# Patient Record
Sex: FEMALE | Race: WHITE | Employment: FULL TIME | URBAN - METROPOLITAN AREA
[De-identification: names, ages, dates, MRNs, and addresses within clinical notes are randomized per-mention and may not be internally consistent; named-entity substitution may affect disease eponyms.]

---

## 2018-04-14 ENCOUNTER — LAB ENCOUNTER (OUTPATIENT)
Dept: LAB | Age: 53
End: 2018-04-14
Attending: INTERNAL MEDICINE
Payer: COMMERCIAL

## 2018-04-14 DIAGNOSIS — Z00.01 ENCOUNTER FOR GENERAL ADULT MEDICAL EXAMINATION WITH ABNORMAL FINDINGS: Primary | ICD-10-CM

## 2018-04-14 PROCEDURE — 85025 COMPLETE CBC W/AUTO DIFF WBC: CPT

## 2018-04-14 PROCEDURE — 80061 LIPID PANEL: CPT

## 2018-04-14 PROCEDURE — 86803 HEPATITIS C AB TEST: CPT

## 2018-04-14 PROCEDURE — 85060 BLOOD SMEAR INTERPRETATION: CPT

## 2018-04-14 PROCEDURE — 80050 GENERAL HEALTH PANEL: CPT

## 2018-04-14 PROCEDURE — 83036 HEMOGLOBIN GLYCOSYLATED A1C: CPT

## 2018-04-14 PROCEDURE — 82306 VITAMIN D 25 HYDROXY: CPT

## 2018-04-14 PROCEDURE — 81001 URINALYSIS AUTO W/SCOPE: CPT

## 2018-04-14 PROCEDURE — 36415 COLL VENOUS BLD VENIPUNCTURE: CPT

## 2018-04-14 PROCEDURE — 80053 COMPREHEN METABOLIC PANEL: CPT

## 2018-05-10 ENCOUNTER — HOSPITAL ENCOUNTER (OUTPATIENT)
Dept: ULTRASOUND IMAGING | Facility: HOSPITAL | Age: 53
Discharge: HOME | End: 2018-05-10
Attending: INTERNAL MEDICINE
Payer: COMMERCIAL

## 2018-05-10 ENCOUNTER — HOSPITAL ENCOUNTER (OUTPATIENT)
Dept: MAMMOGRAPHY | Facility: HOSPITAL | Age: 53
Discharge: HOME | End: 2018-05-10
Attending: INTERNAL MEDICINE
Payer: COMMERCIAL

## 2018-05-10 DIAGNOSIS — IMO0002 LUMP: ICD-10-CM

## 2018-05-10 PROBLEM — M16.0 PRIMARY OSTEOARTHRITIS OF BOTH HIPS: Status: ACTIVE | Noted: 2018-05-10

## 2018-05-10 PROCEDURE — 77066 DX MAMMO INCL CAD BI: CPT | Performed by: INTERNAL MEDICINE

## 2018-05-10 PROCEDURE — 76642 ULTRASOUND BREAST LIMITED: CPT | Performed by: INTERNAL MEDICINE

## 2018-09-11 ENCOUNTER — HOSPITAL ENCOUNTER (OUTPATIENT)
Dept: ULTRASOUND IMAGING | Facility: HOSPITAL | Age: 53
Discharge: HOME | End: 2018-09-11
Attending: INTERNAL MEDICINE
Payer: COMMERCIAL

## 2018-09-11 DIAGNOSIS — R19.00 PELVIC MASS: ICD-10-CM

## 2018-09-11 PROCEDURE — 76856 US EXAM PELVIC COMPLETE: CPT | Performed by: INTERNAL MEDICINE

## 2018-09-11 PROCEDURE — 76830 TRANSVAGINAL US NON-OB: CPT | Performed by: INTERNAL MEDICINE

## 2018-09-24 ENCOUNTER — OFFICE VISIT (OUTPATIENT)
Dept: OBGYN CLINIC | Facility: CLINIC | Age: 53
End: 2018-09-24
Payer: COMMERCIAL

## 2018-09-24 VITALS — HEART RATE: 78 BPM | DIASTOLIC BLOOD PRESSURE: 77 MMHG | SYSTOLIC BLOOD PRESSURE: 126 MMHG

## 2018-09-24 DIAGNOSIS — Z12.4 SCREENING FOR MALIGNANT NEOPLASM OF CERVIX: Primary | ICD-10-CM

## 2018-09-24 DIAGNOSIS — N95.0 POST-MENOPAUSAL BLEEDING: ICD-10-CM

## 2018-09-24 DIAGNOSIS — D25.1 INTRAMURAL LEIOMYOMA OF UTERUS: ICD-10-CM

## 2018-09-24 DIAGNOSIS — N95.0 POSTMENOPAUSAL BLEEDING: ICD-10-CM

## 2018-09-24 PROCEDURE — 58100 BIOPSY OF UTERUS LINING: CPT | Performed by: OBSTETRICS & GYNECOLOGY

## 2018-09-24 PROCEDURE — 99213 OFFICE O/P EST LOW 20 MIN: CPT | Performed by: OBSTETRICS & GYNECOLOGY

## 2018-09-24 NOTE — PROGRESS NOTES
HPI:    Patient ID: Sandeep Randle is a 48year old female. HPI  and . SHe did have amenorrhea from 2017 to 2018 and then bled 3 days. Hx of fibroid embolization with Dr Rae Lau in .  Last US was  and visit was with Dr Mell Chandra No murmur heard. Pulmonary/Chest: Effort normal and breath sounds normal. She has no wheezes. She has no rales. Bilateral breasts without skin / nipple changes, mass, tenderness or axillary adenopathy. Abdominal: Soft.  She exhibits no distension a

## 2018-09-28 ENCOUNTER — TELEPHONE (OUTPATIENT)
Dept: OBGYN CLINIC | Facility: CLINIC | Age: 53
End: 2018-09-28

## 2018-09-28 NOTE — TELEPHONE ENCOUNTER
Informed pt PAP/HPV results were both negative. Informed pt that [facility] results were not reviewed by TIMMY and message will be sent to TIMMY to review and advise. Pt aware we will let her know once TIMMY responds. Pt verbalized understanding.

## 2018-10-02 ENCOUNTER — TELEPHONE (OUTPATIENT)
Dept: OBGYN CLINIC | Facility: CLINIC | Age: 53
End: 2018-10-02

## 2018-10-02 NOTE — TELEPHONE ENCOUNTER
Pt states she had an MRI done a few weeks ago that she wants TIMMY to review. I do not see that we received it. Fax # given, pt will ask office to resend to us.

## 2018-10-03 ENCOUNTER — TELEPHONE (OUTPATIENT)
Dept: OBGYN CLINIC | Facility: CLINIC | Age: 53
End: 2018-10-03

## 2018-10-03 NOTE — TELEPHONE ENCOUNTER
Received MRI of the pelvis without and with IV contrast from [facility]. Placed on Ihsan's desk.

## 2018-10-04 PROBLEM — N95.0 POST-MENOPAUSAL BLEEDING: Status: ACTIVE | Noted: 2018-10-04

## 2018-10-04 PROBLEM — D21.9 FIBROID: Status: ACTIVE | Noted: 2018-10-04

## 2018-10-04 NOTE — TELEPHONE ENCOUNTER
Pt informed that MRI results were received and placed on Fuentes desk yesterday. Pt informed that once TIMMY reviews report we will contact her with any recs. Pt verbalized understanding.

## 2018-10-04 NOTE — PROGRESS NOTES
Endometrial Biopsy    Pre-Procedure Care:   Consent was obtained. Procedure/risks were explained. Questions were answered. Correct patient was identified. Correct side and site were confirmed.     Pregnancy Results: n/a   Birth control method(s) used: P

## 2018-10-08 NOTE — TELEPHONE ENCOUNTER
Pt stated that she needs TIMMY to review MRI report that was placed on Fuentes desk. Pt stated she needs to know \"what the results mean to him\" and what her next step will be.  Pt asking if \"the growth can just be left there or if it needs to be surgically re

## 2018-10-10 NOTE — TELEPHONE ENCOUNTER
PER PT CALLING BACK TO CHECK THE STATUS OF GETTING A CALL BACK / REGARDING HIS OPINION ON  TEST RESULTS / PLS ADV

## 2018-10-11 NOTE — TELEPHONE ENCOUNTER
PER PT REQUESTING TO SPEAK TO DR WARNER / REGARDING TEST RESULTS OF THE BRAD / PT STATE SHE WOULD LIKE TO KNOW THE NEXT STEP / PLS ADV

## 2018-10-11 NOTE — TELEPHONE ENCOUNTER
Pt states she is waiting to speak to TIMMY regarding \"next steps\". Pt reports having \"terrible hip pain when I walk. \" Pt states Marilee Kat will not see pt until TIMMY \"determines what growth is on my hip. \" Informed pt that message was sent to TIMMY and anjelica

## 2018-10-15 NOTE — TELEPHONE ENCOUNTER
Pt upset she has not heard back from TIMMY, would like a message sent to him. Pt does not want a call back from the nurse.  Please advise

## 2018-10-16 ENCOUNTER — TELEPHONE (OUTPATIENT)
Dept: OBGYN CLINIC | Facility: CLINIC | Age: 53
End: 2018-10-16

## 2018-10-16 NOTE — TELEPHONE ENCOUNTER
LMTCB. Sent to TIMMY to call the pt to discuss the results and next step after review the results. See previous notes.

## 2018-10-18 NOTE — TELEPHONE ENCOUNTER
Spoke with MA who is rooming TIMMY in [place] to please relay message to TIMMY that pt is still waiting for his call.

## 2018-10-18 NOTE — TELEPHONE ENCOUNTER
Pt. States that she has been waiting 3 weeks to get her MRI of Pelvis results. Pt. States that she needs to find out, next plan of treatment, after reviewing results.

## 2018-10-25 NOTE — TELEPHONE ENCOUNTER
TIMMY Needs to look at 3 MRI's last MRI showed with contrast. All MRI's need to be reviewed to determine if fibroid needs to be removed. please advise.  Pt needs reading for the Orthopaedic Dr who won't proceed with any treatment until he get's reading

## 2018-10-29 NOTE — TELEPHONE ENCOUNTER
Pt walked into office this morning stating its been 5 weeks and still has not heard from TIMMY. Pt states she cant go back to orthopedic doctor until she gets the all clear from TIMMY. States she is extremely frustrated and is considering changing physicians due to the lack of communication.

## 2018-10-30 ENCOUNTER — TELEPHONE (OUTPATIENT)
Dept: OBGYN CLINIC | Facility: CLINIC | Age: 53
End: 2018-10-30

## 2018-10-30 DIAGNOSIS — D25.9 UTERINE LEIOMYOMA, UNSPECIFIED LOCATION: Primary | ICD-10-CM

## 2018-10-30 NOTE — TELEPHONE ENCOUNTER
Carine Haley and I spoke yesterday concerning MRI, US and suspected large fibroid. We discussed recommended surgery. I do not recommend myomectomy due to age group and no further pregnancy. We should remove uterus intact.  She has  hx and the overall size of

## 2018-10-31 NOTE — TELEPHONE ENCOUNTER
Balta. Patient scheduled for her procedure 11/26/18 7:30am VARUN/BSO Dr. Loco Myaa. Pat orders. Instructions mailed to address on file.

## 2018-11-05 NOTE — TELEPHONE ENCOUNTER
Pt is calling to speak to office asking to speak to [facility] - CAROLINE AMOL Directly ,

## 2018-11-08 NOTE — TELEPHONE ENCOUNTER
Prior auth for cpt 26163/inpatient was intiated. Clinical info routed to [phone]. REF [id number]. Will fl/u for status of approval in 3-5 business days.

## 2018-11-12 NOTE — TELEPHONE ENCOUNTER
Received documentation noting the the procedure and initial day was approved, if patient exceeds that time, hospital staff will needed to obtain PA for those days.  [id number]

## 2018-11-17 RX ORDER — IBUPROFEN 400 MG/1
400 TABLET ORAL EVERY 6 HOURS PRN
Status: ON HOLD | COMMUNITY
End: 2018-11-29

## 2018-11-19 ENCOUNTER — APPOINTMENT (OUTPATIENT)
Dept: LAB | Age: 53
End: 2018-11-19
Attending: OBSTETRICS & GYNECOLOGY
Payer: COMMERCIAL

## 2018-11-19 ENCOUNTER — LAB ENCOUNTER (OUTPATIENT)
Dept: LAB | Age: 53
End: 2018-11-19
Attending: OBSTETRICS & GYNECOLOGY
Payer: COMMERCIAL

## 2018-11-19 DIAGNOSIS — Z01.818 PREOPERATIVE TESTING: ICD-10-CM

## 2018-11-19 PROCEDURE — 86900 BLOOD TYPING SEROLOGIC ABO: CPT

## 2018-11-19 PROCEDURE — 80048 BASIC METABOLIC PNL TOTAL CA: CPT

## 2018-11-19 PROCEDURE — 85025 COMPLETE CBC W/AUTO DIFF WBC: CPT

## 2018-11-19 PROCEDURE — 86850 RBC ANTIBODY SCREEN: CPT

## 2018-11-19 PROCEDURE — 36415 COLL VENOUS BLD VENIPUNCTURE: CPT

## 2018-11-19 PROCEDURE — 93005 ELECTROCARDIOGRAM TRACING: CPT

## 2018-11-19 PROCEDURE — 93010 ELECTROCARDIOGRAM REPORT: CPT | Performed by: OBSTETRICS & GYNECOLOGY

## 2018-11-19 PROCEDURE — 86901 BLOOD TYPING SEROLOGIC RH(D): CPT

## 2018-11-26 ENCOUNTER — ANESTHESIA EVENT (OUTPATIENT)
Dept: SURGERY | Facility: HOSPITAL | Age: 53
DRG: 742 | End: 2018-11-26
Payer: COMMERCIAL

## 2018-11-26 ENCOUNTER — HOSPITAL ENCOUNTER (INPATIENT)
Facility: HOSPITAL | Age: 53
LOS: 3 days | Discharge: HOME | DRG: 742 | End: 2018-11-29
Attending: OBSTETRICS & GYNECOLOGY | Admitting: OBSTETRICS & GYNECOLOGY
Payer: COMMERCIAL

## 2018-11-26 ENCOUNTER — ANESTHESIA (OUTPATIENT)
Dept: SURGERY | Facility: HOSPITAL | Age: 53
DRG: 742 | End: 2018-11-26
Payer: COMMERCIAL

## 2018-11-26 DIAGNOSIS — Z01.818 PREOPERATIVE TESTING: Primary | ICD-10-CM

## 2018-11-26 DIAGNOSIS — D25.9 UTERINE LEIOMYOMA, UNSPECIFIED LOCATION: ICD-10-CM

## 2018-11-26 PROBLEM — Z90.710 STATUS POST TOTAL ABDOMINAL HYSTERECTOMY AND BILATERAL SALPINGO-OOPHORECTOMY: Status: ACTIVE | Noted: 2018-11-26

## 2018-11-26 PROBLEM — K21.9 GASTROESOPHAGEAL REFLUX DISEASE WITHOUT ESOPHAGITIS: Status: ACTIVE | Noted: 2018-11-26

## 2018-11-26 PROBLEM — E11.9 TYPE 2 DIABETES MELLITUS (HCC): Status: ACTIVE | Noted: 2018-11-26

## 2018-11-26 PROBLEM — Z90.710 STATUS POST HYSTERECTOMY: Status: ACTIVE | Noted: 2018-11-26

## 2018-11-26 PROBLEM — Z90.722 STATUS POST TOTAL ABDOMINAL HYSTERECTOMY AND BILATERAL SALPINGO-OOPHORECTOMY: Status: ACTIVE | Noted: 2018-11-26

## 2018-11-26 PROBLEM — Z90.79 STATUS POST TOTAL ABDOMINAL HYSTERECTOMY AND BILATERAL SALPINGO-OOPHORECTOMY: Status: ACTIVE | Noted: 2018-11-26

## 2018-11-26 PROCEDURE — 0UT70ZZ RESECTION OF BILATERAL FALLOPIAN TUBES, OPEN APPROACH: ICD-10-PCS | Performed by: OBSTETRICS & GYNECOLOGY

## 2018-11-26 PROCEDURE — 0UT20ZZ RESECTION OF BILATERAL OVARIES, OPEN APPROACH: ICD-10-PCS | Performed by: OBSTETRICS & GYNECOLOGY

## 2018-11-26 PROCEDURE — 0DNW0ZZ RELEASE PERITONEUM, OPEN APPROACH: ICD-10-PCS | Performed by: OBSTETRICS & GYNECOLOGY

## 2018-11-26 PROCEDURE — 0UT90ZZ RESECTION OF UTERUS, OPEN APPROACH: ICD-10-PCS | Performed by: OBSTETRICS & GYNECOLOGY

## 2018-11-26 PROCEDURE — 58150 TOTAL HYSTERECTOMY: CPT | Performed by: OBSTETRICS & GYNECOLOGY

## 2018-11-26 PROCEDURE — 0DNU0ZZ RELEASE OMENTUM, OPEN APPROACH: ICD-10-PCS | Performed by: OBSTETRICS & GYNECOLOGY

## 2018-11-26 RX ORDER — HYDROCODONE BITARTRATE AND ACETAMINOPHEN 5; 325 MG/1; MG/1
2 TABLET ORAL AS NEEDED
Status: DISCONTINUED | OUTPATIENT
Start: 2018-11-26 | End: 2018-11-26 | Stop reason: HOSPADM

## 2018-11-26 RX ORDER — MORPHINE SULFATE 4 MG/ML
4 INJECTION, SOLUTION INTRAMUSCULAR; INTRAVENOUS EVERY 10 MIN PRN
Status: DISCONTINUED | OUTPATIENT
Start: 2018-11-26 | End: 2018-11-26 | Stop reason: HOSPADM

## 2018-11-26 RX ORDER — SODIUM CHLORIDE, SODIUM LACTATE, POTASSIUM CHLORIDE, CALCIUM CHLORIDE 600; 310; 30; 20 MG/100ML; MG/100ML; MG/100ML; MG/100ML
INJECTION, SOLUTION INTRAVENOUS CONTINUOUS
Status: DISCONTINUED | OUTPATIENT
Start: 2018-11-26 | End: 2018-11-26

## 2018-11-26 RX ORDER — MORPHINE SULFATE 4 MG/ML
2 INJECTION, SOLUTION INTRAMUSCULAR; INTRAVENOUS EVERY 10 MIN PRN
Status: DISCONTINUED | OUTPATIENT
Start: 2018-11-26 | End: 2018-11-26 | Stop reason: HOSPADM

## 2018-11-26 RX ORDER — SODIUM PHOSPHATE, DIBASIC AND SODIUM PHOSPHATE, MONOBASIC 7; 19 G/133ML; G/133ML
1 ENEMA RECTAL ONCE AS NEEDED
Status: DISCONTINUED | OUTPATIENT
Start: 2018-11-26 | End: 2018-11-29

## 2018-11-26 RX ORDER — MIDAZOLAM HYDROCHLORIDE 1 MG/ML
INJECTION INTRAMUSCULAR; INTRAVENOUS AS NEEDED
Status: DISCONTINUED | OUTPATIENT
Start: 2018-11-26 | End: 2018-11-26 | Stop reason: SURG

## 2018-11-26 RX ORDER — HEPARIN SODIUM 5000 [USP'U]/ML
5000 INJECTION, SOLUTION INTRAVENOUS; SUBCUTANEOUS ONCE
Status: COMPLETED | OUTPATIENT
Start: 2018-11-26 | End: 2018-11-26

## 2018-11-26 RX ORDER — ONDANSETRON 2 MG/ML
4 INJECTION INTRAMUSCULAR; INTRAVENOUS ONCE AS NEEDED
Status: DISCONTINUED | OUTPATIENT
Start: 2018-11-26 | End: 2018-11-26 | Stop reason: HOSPADM

## 2018-11-26 RX ORDER — FAMOTIDINE 20 MG/1
20 TABLET ORAL ONCE
Status: COMPLETED | OUTPATIENT
Start: 2018-11-26 | End: 2018-11-26

## 2018-11-26 RX ORDER — METOCLOPRAMIDE 10 MG/1
10 TABLET ORAL ONCE
Status: COMPLETED | OUTPATIENT
Start: 2018-11-26 | End: 2018-11-26

## 2018-11-26 RX ORDER — DEXTROSE MONOHYDRATE 25 G/50ML
50 INJECTION, SOLUTION INTRAVENOUS AS NEEDED
Status: DISCONTINUED | OUTPATIENT
Start: 2018-11-26 | End: 2018-11-29

## 2018-11-26 RX ORDER — DIPHENHYDRAMINE HYDROCHLORIDE 50 MG/ML
12.5 INJECTION INTRAMUSCULAR; INTRAVENOUS EVERY 4 HOURS PRN
Status: DISCONTINUED | OUTPATIENT
Start: 2018-11-26 | End: 2018-11-29

## 2018-11-26 RX ORDER — HEPARIN SODIUM 5000 [USP'U]/ML
5000 INJECTION, SOLUTION INTRAVENOUS; SUBCUTANEOUS EVERY 8 HOURS SCHEDULED
Status: DISCONTINUED | OUTPATIENT
Start: 2018-11-26 | End: 2018-11-29

## 2018-11-26 RX ORDER — ACETAMINOPHEN 500 MG
1000 TABLET ORAL ONCE
Status: COMPLETED | OUTPATIENT
Start: 2018-11-26 | End: 2018-11-26

## 2018-11-26 RX ORDER — NALOXONE HYDROCHLORIDE 0.4 MG/ML
80 INJECTION, SOLUTION INTRAMUSCULAR; INTRAVENOUS; SUBCUTANEOUS AS NEEDED
Status: DISCONTINUED | OUTPATIENT
Start: 2018-11-26 | End: 2018-11-26 | Stop reason: HOSPADM

## 2018-11-26 RX ORDER — DOCUSATE SODIUM 100 MG/1
100 CAPSULE, LIQUID FILLED ORAL 2 TIMES DAILY
Status: DISCONTINUED | OUTPATIENT
Start: 2018-11-26 | End: 2018-11-29

## 2018-11-26 RX ORDER — SODIUM CHLORIDE, SODIUM LACTATE, POTASSIUM CHLORIDE, CALCIUM CHLORIDE 600; 310; 30; 20 MG/100ML; MG/100ML; MG/100ML; MG/100ML
INJECTION, SOLUTION INTRAVENOUS CONTINUOUS
Status: DISCONTINUED | OUTPATIENT
Start: 2018-11-26 | End: 2018-11-29

## 2018-11-26 RX ORDER — SODIUM CHLORIDE 0.9 % (FLUSH) 0.9 %
10 SYRINGE (ML) INJECTION AS NEEDED
Status: DISCONTINUED | OUTPATIENT
Start: 2018-11-26 | End: 2018-11-26 | Stop reason: HOSPADM

## 2018-11-26 RX ORDER — INSULIN ASPART 100 [IU]/ML
6 INJECTION, SOLUTION INTRAVENOUS; SUBCUTANEOUS ONCE
Status: COMPLETED | OUTPATIENT
Start: 2018-11-26 | End: 2018-11-26

## 2018-11-26 RX ORDER — KETOROLAC TROMETHAMINE 30 MG/ML
30 INJECTION, SOLUTION INTRAMUSCULAR; INTRAVENOUS EVERY 6 HOURS PRN
Status: ACTIVE | OUTPATIENT
Start: 2018-11-26 | End: 2018-11-28

## 2018-11-26 RX ORDER — HYDROCODONE BITARTRATE AND ACETAMINOPHEN 5; 325 MG/1; MG/1
1 TABLET ORAL AS NEEDED
Status: DISCONTINUED | OUTPATIENT
Start: 2018-11-26 | End: 2018-11-26 | Stop reason: HOSPADM

## 2018-11-26 RX ORDER — NALOXONE HYDROCHLORIDE 0.4 MG/ML
0.08 INJECTION, SOLUTION INTRAMUSCULAR; INTRAVENOUS; SUBCUTANEOUS
Status: DISCONTINUED | OUTPATIENT
Start: 2018-11-26 | End: 2018-11-29

## 2018-11-26 RX ORDER — MORPHINE SULFATE 10 MG/ML
6 INJECTION, SOLUTION INTRAMUSCULAR; INTRAVENOUS EVERY 10 MIN PRN
Status: DISCONTINUED | OUTPATIENT
Start: 2018-11-26 | End: 2018-11-26 | Stop reason: HOSPADM

## 2018-11-26 RX ORDER — NALBUPHINE HCL 10 MG/ML
2.5 AMPUL (ML) INJECTION EVERY 4 HOURS PRN
Status: DISCONTINUED | OUTPATIENT
Start: 2018-11-26 | End: 2018-11-29

## 2018-11-26 RX ORDER — GLYCOPYRROLATE 0.2 MG/ML
INJECTION INTRAMUSCULAR; INTRAVENOUS AS NEEDED
Status: DISCONTINUED | OUTPATIENT
Start: 2018-11-26 | End: 2018-11-26 | Stop reason: SURG

## 2018-11-26 RX ORDER — CEFAZOLIN SODIUM/WATER 2 G/20 ML
2 SYRINGE (ML) INTRAVENOUS ONCE
Status: COMPLETED | OUTPATIENT
Start: 2018-11-26 | End: 2018-11-26

## 2018-11-26 RX ORDER — ONDANSETRON 2 MG/ML
4 INJECTION INTRAMUSCULAR; INTRAVENOUS EVERY 8 HOURS PRN
Status: DISCONTINUED | OUTPATIENT
Start: 2018-11-26 | End: 2018-11-29

## 2018-11-26 RX ORDER — POLYETHYLENE GLYCOL 3350 17 G/17G
17 POWDER, FOR SOLUTION ORAL DAILY PRN
Status: DISCONTINUED | OUTPATIENT
Start: 2018-11-26 | End: 2018-11-29

## 2018-11-26 RX ORDER — DEXTROSE MONOHYDRATE 25 G/50ML
50 INJECTION, SOLUTION INTRAVENOUS
Status: DISCONTINUED | OUTPATIENT
Start: 2018-11-26 | End: 2018-11-26 | Stop reason: HOSPADM

## 2018-11-26 RX ORDER — SODIUM CHLORIDE 0.9 % (FLUSH) 0.9 %
10 SYRINGE (ML) INJECTION AS NEEDED
Status: DISCONTINUED | OUTPATIENT
Start: 2018-11-26 | End: 2018-11-29

## 2018-11-26 RX ORDER — BISACODYL 10 MG
10 SUPPOSITORY, RECTAL RECTAL
Status: DISCONTINUED | OUTPATIENT
Start: 2018-11-26 | End: 2018-11-29

## 2018-11-26 RX ORDER — LIDOCAINE HYDROCHLORIDE 10 MG/ML
INJECTION, SOLUTION EPIDURAL; INFILTRATION; INTRACAUDAL; PERINEURAL AS NEEDED
Status: DISCONTINUED | OUTPATIENT
Start: 2018-11-26 | End: 2018-11-26 | Stop reason: SURG

## 2018-11-26 RX ORDER — HALOPERIDOL 5 MG/ML
0.25 INJECTION INTRAMUSCULAR ONCE AS NEEDED
Status: DISCONTINUED | OUTPATIENT
Start: 2018-11-26 | End: 2018-11-26 | Stop reason: HOSPADM

## 2018-11-26 RX ORDER — ROCURONIUM BROMIDE 10 MG/ML
INJECTION, SOLUTION INTRAVENOUS AS NEEDED
Status: DISCONTINUED | OUTPATIENT
Start: 2018-11-26 | End: 2018-11-26 | Stop reason: SURG

## 2018-11-26 RX ORDER — ONDANSETRON 2 MG/ML
INJECTION INTRAMUSCULAR; INTRAVENOUS AS NEEDED
Status: DISCONTINUED | OUTPATIENT
Start: 2018-11-26 | End: 2018-11-26 | Stop reason: SURG

## 2018-11-26 RX ORDER — SODIUM CHLORIDE, SODIUM LACTATE, POTASSIUM CHLORIDE, CALCIUM CHLORIDE 600; 310; 30; 20 MG/100ML; MG/100ML; MG/100ML; MG/100ML
INJECTION, SOLUTION INTRAVENOUS CONTINUOUS
Status: DISCONTINUED | OUTPATIENT
Start: 2018-11-26 | End: 2018-11-26 | Stop reason: HOSPADM

## 2018-11-26 RX ORDER — ONDANSETRON 4 MG/1
4 TABLET, FILM COATED ORAL EVERY 8 HOURS PRN
Status: DISCONTINUED | OUTPATIENT
Start: 2018-11-26 | End: 2018-11-29

## 2018-11-26 RX ORDER — NEOSTIGMINE METHYLSULFATE 0.5 MG/ML
INJECTION INTRAVENOUS AS NEEDED
Status: DISCONTINUED | OUTPATIENT
Start: 2018-11-26 | End: 2018-11-26 | Stop reason: SURG

## 2018-11-26 RX ADMIN — ROCURONIUM BROMIDE 10 MG: 10 INJECTION, SOLUTION INTRAVENOUS at 09:05:00

## 2018-11-26 RX ADMIN — MIDAZOLAM HYDROCHLORIDE 2 MG: 1 INJECTION INTRAMUSCULAR; INTRAVENOUS at 07:41:00

## 2018-11-26 RX ADMIN — GLYCOPYRROLATE 0.6 MG: 0.2 INJECTION INTRAMUSCULAR; INTRAVENOUS at 09:55:00

## 2018-11-26 RX ADMIN — LIDOCAINE HYDROCHLORIDE 50 MG: 10 INJECTION, SOLUTION EPIDURAL; INFILTRATION; INTRACAUDAL; PERINEURAL at 07:41:00

## 2018-11-26 RX ADMIN — CEFAZOLIN SODIUM/WATER 2 G: 2 G/20 ML SYRINGE (ML) INTRAVENOUS at 07:53:00

## 2018-11-26 RX ADMIN — SODIUM CHLORIDE, SODIUM LACTATE, POTASSIUM CHLORIDE, CALCIUM CHLORIDE: 600; 310; 30; 20 INJECTION, SOLUTION INTRAVENOUS at 07:41:00

## 2018-11-26 RX ADMIN — NEOSTIGMINE METHYLSULFATE 4 MG: 0.5 INJECTION INTRAVENOUS at 09:55:00

## 2018-11-26 RX ADMIN — GLYCOPYRROLATE 0.2 MG: 0.2 INJECTION INTRAMUSCULAR; INTRAVENOUS at 07:41:00

## 2018-11-26 RX ADMIN — SODIUM CHLORIDE, SODIUM LACTATE, POTASSIUM CHLORIDE, CALCIUM CHLORIDE: 600; 310; 30; 20 INJECTION, SOLUTION INTRAVENOUS at 10:00:00

## 2018-11-26 RX ADMIN — SODIUM CHLORIDE, SODIUM LACTATE, POTASSIUM CHLORIDE, CALCIUM CHLORIDE: 600; 310; 30; 20 INJECTION, SOLUTION INTRAVENOUS at 09:15:00

## 2018-11-26 RX ADMIN — ONDANSETRON 4 MG: 2 INJECTION INTRAMUSCULAR; INTRAVENOUS at 07:41:00

## 2018-11-26 RX ADMIN — ROCURONIUM BROMIDE 50 MG: 10 INJECTION, SOLUTION INTRAVENOUS at 07:41:00

## 2018-11-26 NOTE — CONSULTS
[facility]    PATIENT'S NAME: [facility]   ATTENDING PHYSICIAN: Palak Vaz DO   CONSULTING PHYSICIAN: Jesse Reyes MD   PATIENT ACCOUNT#:   [id number]    LOCATION:  [address] [place] [address] Dr #:   [id number]       DATE OF B auscultation. HEART:  Regular rhythm. S1, S2 normal.  BREASTS:  Deferred at this time. ABDOMEN:  Protuberant, soft. There are decreased bowel sounds present. Postoperative tenderness present. EXTREMITIES:  Without edema.   Pulses are 1 to 2+ symmetric

## 2018-11-26 NOTE — ANESTHESIA PROCEDURE NOTES
ANESTHESIA INTUBATION  Urgency: elective    Airway not difficult    General Information and Staff    Patient location during procedure: OR  Anesthesiologist: Ivonne Em MD  Resident/CRNA: Sreekanth Carrasco CRNA  Performed: Guillaume Ortega

## 2018-11-26 NOTE — INTERVAL H&P NOTE
Pre-op Diagnosis: Uterine leiomyoma, unspecified location [D25.9]    The above referenced H&P was reviewed by Lyssa Dunne.  DO Malissa on 11/26/2018, the patient was examined and no significant changes have occurred in the patient's condition since the H&P was

## 2018-11-26 NOTE — ANESTHESIA PREPROCEDURE EVALUATION
Anesthesia PreOp Note    HPI:     Yaa Ferrara is a 48year old female who presents for preoperative consultation requested by: Jody Can DO    Date of Surgery: 11/26/2018    Procedure(s):  ABDOMINAL HYSTERECTOMY  Indication: Uterine leiomyoma Bibi Parker Eagle, CRNA 200 mg at 11/26/18 0741   Rocuronium Bromide (ZEMURON) injection  Intravenous PRN Fiorella Pollen, CRNA 50 mg at 11/26/18 0741   ondansetron HCl (ZOFRAN) injection  Intravenous PRN Fiorella Pollen, CRNA 4 mg CL 98 11/19/2018    CO2 26 11/19/2018    BUN 10 11/19/2018    CREATSERUM 0.71 11/19/2018     (H) 11/19/2018    PGLU 163 (H) 11/26/2018    CA 9.2 11/19/2018          Vital Signs: Body mass index is 34.84 kg/m².    height is 1.626 m (5' 4\") and we

## 2018-11-26 NOTE — H&P
[facility]    History and Physical    Marshall Fabkraig Patient Status:  Surgery Admit    1965 MRN [id number]   Location [place] [address] Attending Max Torres, 3 [facility] Day # 0 PCP Vickey Sanford MD     Date: Tobacco Use      Smoking status: Never Smoker      Smokeless tobacco: Never Used    Alcohol use: No    Drug use: No    Allergies/Medications: Allergies:   Sulfacetamide           HIVES    No medications prior to admission.     Review of Systems:   Review within 1 year of adm    Results:     Lab Results   Component Value Date    WBC 6.3 11/19/2018    HGB 15.7 11/19/2018    HCT 45.9 11/19/2018     11/19/2018    CREATSERUM 0.71 11/19/2018    BUN 10 11/19/2018     (L) 11/19/2018    K 4.5 11/19/201

## 2018-11-26 NOTE — ANESTHESIA POSTPROCEDURE EVALUATION
Patient: Chente Oliver    Procedure Summary     Date:  11/26/18 Room / Location:  [facility] OR  / [facility] OR    Anesthesia Start:  7021 Anesthesia Stop:  6790    Procedure:  ABDOMINAL HYSTERECTOMY (Bilateral Abdomen) Diagnosis:       Uterine leiomyom

## 2018-11-26 NOTE — BRIEF OP NOTE
Pre-Operative Diagnosis: Uterine leiomyoma, unspecified location [D25.9]     Post-Operative Diagnosis: Uterine leiomyoma, unspecified location [D25.9]      Procedure Performed:   Procedure(s):  Total abdominal hysterectomy with bilateral salpingo-oophorect

## 2018-11-26 NOTE — OPERATIVE REPORT
[facility]    PATIENT'S NAME: Ray Potter   ATTENDING PHYSICIAN: Dai Vaz DO   OPERATING PHYSICIAN: Paulina Vaz DO   PATIENT ACCOUNT#:   [de-identified]    LOCATION:  [facility] PACU 7 [facility] 10  MEDICAL RECORD #:   [id number]       D from the anterior wall. At this point, we encountered the dense adhesions from the anterior uterine wall to the anterior abdominal wall as well. These were taken down sharply though it was difficult to gauge where bladder was at this time.   Decision was were left with a large peritoneal capsule posterior to the posterior wall of the vagina. I initially left vagina open so we could use a running suture of 0 Vicryl to close the peritoneal pocket that previously contained the fibroid.   Good hemostasis was o

## 2018-11-27 ENCOUNTER — APPOINTMENT (OUTPATIENT)
Dept: CT IMAGING | Facility: HOSPITAL | Age: 53
DRG: 742 | End: 2018-11-27
Attending: INTERNAL MEDICINE
Payer: COMMERCIAL

## 2018-11-27 ENCOUNTER — APPOINTMENT (OUTPATIENT)
Dept: GENERAL RADIOLOGY | Facility: HOSPITAL | Age: 53
DRG: 742 | End: 2018-11-27
Attending: OBSTETRICS & GYNECOLOGY
Payer: COMMERCIAL

## 2018-11-27 PROCEDURE — 71046 X-RAY EXAM CHEST 2 VIEWS: CPT | Performed by: OBSTETRICS & GYNECOLOGY

## 2018-11-27 PROCEDURE — 71260 CT THORAX DX C+: CPT | Performed by: INTERNAL MEDICINE

## 2018-11-27 RX ORDER — ALBUTEROL SULFATE 2.5 MG/3ML
2.5 SOLUTION RESPIRATORY (INHALATION) EVERY 6 HOURS PRN
Status: DISCONTINUED | OUTPATIENT
Start: 2018-11-27 | End: 2018-11-28

## 2018-11-27 RX ORDER — ALBUTEROL SULFATE 0.75 MG/3ML
0.63 SOLUTION RESPIRATORY (INHALATION) ONCE
Status: DISCONTINUED | OUTPATIENT
Start: 2018-11-27 | End: 2018-11-27

## 2018-11-27 RX ORDER — HYDROCODONE BITARTRATE AND ACETAMINOPHEN 7.5; 325 MG/1; MG/1
1 TABLET ORAL EVERY 4 HOURS PRN
Status: DISCONTINUED | OUTPATIENT
Start: 2018-11-27 | End: 2018-11-29

## 2018-11-27 RX ORDER — ALBUTEROL SULFATE 0.75 MG/3ML
1.25 SOLUTION RESPIRATORY (INHALATION) ONCE
Status: DISCONTINUED | OUTPATIENT
Start: 2018-11-27 | End: 2018-11-28

## 2018-11-27 NOTE — PLAN OF CARE
Problem: Diabetes/Glucose Control  Goal: Glucose maintained within prescribed range  INTERVENTIONS:  - Monitor Blood Glucose as ordered  - Assess for signs and symptoms of hyperglycemia and hypoglycemia  - Administer ordered medications to maintain glucose discharge w/pt and caregiver  - Include patient/family/discharge partner in discharge planning  - Arrange for needed discharge resources and transportation as appropriate  - Identify discharge learning needs (meds, wound care, etc)  - Arrange for interpret

## 2018-11-27 NOTE — PAYOR COMM NOTE
--------------REQUESTING ADDITIONAL DAY  11/27    CONTINUED STAY REVIEW    Payor: [address]  Subscriber #:  [id number]  Authorization Number: [id number]    Admit date: 11/26/18  Admit time: 26    Admitting Physician: DO LOU Hanley MetFORMIN HCl (GLUCOPHAGE) tab 500 mg     Date Action Dose Route User    11/27/2018 4260 Given 500 mg Oral Xiao Gregory, WALLY      Ondansetron HCl [facility]) tab 4 mg     Date Action Dose Route User    11/27/2018 0004 Given 4 mg Oral Reed Reyes RN PLT  223         No results for input(s): GLU, BUN, CREATSERUM, GFRAA, GFRNAA, CA, ALB, NA, K, CL, CO2, ALKPHO, AST, ALT, GGT, BILT, TP in the last 168 hours.     No results for input(s): LIP, DARRYL in the last 168 hours.     No results for input(s): TROP, C 11/27/2018 13.0 12.0 - 16.0 g/dL Final   11/19/2018 15.7 12.0 - 16.0 g/dL Final   04/14/2018 17.0 (H) 12.0 - 16.0 g/dL Final            HEMOGLOBIN (L)   Date Value Ref Range Status   09/30/2016 16.7 (H) 12.0 - 16.0 G/DL Final            PLT   Date Value Re

## 2018-11-27 NOTE — PROGRESS NOTES
[facility]    OB/GYNE Progress Note      Anne Mohan Patient Status:  Inpatient    1965 MRN [id number]   Location [facility] 4W/SW/SE Attending Jermaine 111 Day # 1 PCP Oracio Stroud MD        Assessment/ 11/27/2018    CREATSERUM 0.71 11/19/2018    BUN 10 11/19/2018     (L) 11/19/2018    K 4.5 11/19/2018    CL 98 11/19/2018    CO2 26 11/19/2018     (H) 11/19/2018    CA 9.2 11/19/2018    ALB 4.3 04/14/2018    ALKPHO 118 (H) 04/14/2018    BILT 0.

## 2018-11-27 NOTE — PROGRESS NOTES
[place] [facility]    Progress Note    Juan M Mendes Patient Status:  Inpatient    1965 MRN [id number]   Location [facility] 4W/SW/SE Attending Ceci Chavez, 3 [facility] Day # 1 PCP Mirella Dominguez MD       Subjective:   Davis Champagne CREATININE (P)   Date Value Ref Range Status   09/30/2016 0.85 0.50 - 1.50 mg/dL Final     GFR/   Date Value Ref Range Status   09/30/2016 >60 >60 Final     Comment:     Chronic Kidney Disease: GFR <60 ml/min/1.73 m2  Kidney failure: GF 1-5 Units Subcutaneous TID CC       Continuous Infusions:   • lactated ringers 125 mL/hr at 11/27/18 0648   • HYDROmorphone 0.2mg/mL in NS 30 mL Stopped (11/27/18 1244)                       Reed Godinez MD  11/27/2018

## 2018-11-28 PROBLEM — J98.11 ATELECTASIS OF BOTH LUNGS: Status: ACTIVE | Noted: 2018-11-28

## 2018-11-28 RX ORDER — ALBUTEROL SULFATE 2.5 MG/3ML
2.5 SOLUTION RESPIRATORY (INHALATION) ONCE
Status: DISCONTINUED | OUTPATIENT
Start: 2018-11-28 | End: 2018-11-29

## 2018-11-28 NOTE — PLAN OF CARE
Problem: Diabetes/Glucose Control  Goal: Glucose maintained within prescribed range  INTERVENTIONS:  - Monitor Blood Glucose as ordered  - Assess for signs and symptoms of hyperglycemia and hypoglycemia  - Administer ordered medications to maintain glucose as indicated by assessment.  - Educate pt/family on patient safety including physical limitations  - Instruct pt to call for assistance with activity based on assessment  - Modify environment to reduce risk of injury  - Provide assistive devices as appropr

## 2018-11-28 NOTE — PROGRESS NOTES
[place] [facility] - [facility]    OB/GYNE Progress Note      Nolon Felt Patient Status:  Inpatient    1965 MRN [id number]   Location [facility] 4W/SW/SE Attending Jermaine 111 Day # 2 PCP Reed Godinez MD        Assessment/ (Last 6): Wt Readings from Last 6 Encounters:  11/17/18 : 203 lb (92.1 kg)    Body mass index is 34.84 kg/m².     DVT Risk Score: 4  VTE Prophylaxis Ordered: yes    Matos Catheter Information  Does patient have a matos catheter: no    Lungs show good effor

## 2018-11-28 NOTE — PROGRESS NOTES
Upon assessing patient, oxygen level qwas noted to be in the upper 80s, encouraged patient to take some deep breaths, oxygen level did rise to 92%, placed on 2l NC. Pulse also tachy in the 100s-110s. Denies chest pain or shortness of breath.  Dr Max Torres castro

## 2018-11-28 NOTE — PROGRESS NOTES
[place] [facility] - [facility]    Progress Note    Yunior Khan Patient Status:  Inpatient    1965 MRN [id number]   Location [facility] 4W/SW/SE Attending Zara Gale, 3 [facility] Day # 2 PCP Ana Oleary MD       Subjective:   [place] 11/28/18   0901  11/28/18   1328   PGLU  215*  215*  155*  183*       Creatinine   Date Value Ref Range Status   11/27/2018 0.77 0.50 - 1.50 mg/dL Final   11/19/2018 0.71 0.50 - 1.50 mg/dL Final   04/14/2018 0.67 0.50 - 1.50 mg/dL Final     CREATININE (P) PLT   Date Value Ref Range Status   11/27/2018 233 140 - 400 K/UL Final   11/27/2018 223 140 - 400 K/UL Final   11/19/2018 251 140 - 400 K/UL Final   04/14/2018 206 140 - 400 K/UL Final     PLATELET COUNT (L)   Date Value Ref Range Status   09/30/2016 11/28/2018 at 9:15     Approved by (CST): General Familia MD on 11/28/2018 at 9:23          Ekg 12-lead    Result Date: 11/27/2018  ECG Report  Interpretation  -------------------------- Sinus Tachycardia - Twave abnormality.   Low voltage with rightward P-

## 2018-11-29 VITALS
OXYGEN SATURATION: 94 % | HEART RATE: 89 BPM | BODY MASS INDEX: 34.66 KG/M2 | DIASTOLIC BLOOD PRESSURE: 70 MMHG | TEMPERATURE: 98 F | RESPIRATION RATE: 20 BRPM | SYSTOLIC BLOOD PRESSURE: 124 MMHG | WEIGHT: 203 LBS | HEIGHT: 64 IN

## 2018-11-29 RX ORDER — HYDROCODONE BITARTRATE AND ACETAMINOPHEN 7.5; 325 MG/1; MG/1
1 TABLET ORAL EVERY 4 HOURS PRN
Qty: 20 TABLET | Refills: 0 | Status: SHIPPED | OUTPATIENT
Start: 2018-11-29 | End: 2019-01-10

## 2018-11-29 RX ORDER — PSEUDOEPHEDRINE HCL 30 MG
100 TABLET ORAL 2 TIMES DAILY
Qty: 30 CAPSULE | Refills: 0 | Status: SHIPPED | OUTPATIENT
Start: 2018-11-29 | End: 2019-01-10

## 2018-11-29 RX ORDER — IBUPROFEN 600 MG/1
600 TABLET ORAL EVERY 6 HOURS PRN
Qty: 20 TABLET | Refills: 0 | Status: SHIPPED | OUTPATIENT
Start: 2018-11-29

## 2018-11-29 NOTE — PROGRESS NOTES
[place] [facility] - [facility]    Progress Note    Francis Chew Patient Status:  Inpatient    1965 MRN [id number]   Location [facility] 4W/SW/SE Attending Whit Cummins, 3 [facility] Day # 3 PCP Niya Black MD       Subjective:   Moreno Marquez Status   11/27/2018 0.77 0.50 - 1.50 mg/dL Final   11/19/2018 0.71 0.50 - 1.50 mg/dL Final   04/14/2018 0.67 0.50 - 1.50 mg/dL Final     CREATININE (P)   Date Value Ref Range Status   09/30/2016 0.85 0.50 - 1.50 mg/dL Final     GFR/AFRICAN AMERICAN   Date Final   11/19/2018 251 140 - 400 K/UL Final   04/14/2018 206 140 - 400 K/UL Final     PLATELET COUNT (L)   Date Value Ref Range Status   09/30/2016 234 140 - 400 K/UL Final       Scheduled Meds:   • albuterol sulfate  2.5 mg Nebulization Once   • insulin d 12-lead    Result Date: 11/27/2018  ECG Report  Interpretation  -------------------------- Sinus Tachycardia - Twave abnormality. Low voltage with rightward P-axis and rotation -possible pulmonary disease.  ABNORMAL When compared with ECG of 11/19/2018 08:

## 2018-11-29 NOTE — PROGRESS NOTES
[facility]    OB/GYNE Progress Note      Shannan Zaman Patient Status:  Inpatient    1965 MRN [id number]   Location [facility] 4W/SW/SE Attending Jermaine 111 Day # 3 PCP Kush Marquez MD        Assessment/ 8.4 (L) 11/27/2018    ALB 4.3 04/14/2018    ALKPHO 118 (H) 04/14/2018    BILT 0.7 04/14/2018    TP 7.0 04/14/2018    AST 22 04/14/2018    ALT 36 04/14/2018    TSH 1.89 04/14/2018       Lab Results   Component Value Date    COLORUR Yellow 04/14/2018    CLAR

## 2018-11-29 NOTE — PAYOR COMM NOTE
PLEASE CONFIRM DAYS APPROVED. THANKS!   [address] REVIEW    Payor: [address]  Subscriber #:  [id number]  Authorization Number: [id number]    Admit date: 11/26/18  Admit time:  2205  Discharge Date: 11/29/2018 12:13 PM     Admitting [id number]   Location [facility] 4W/SW/SE Attending Jermaine 111 Day # 3 PCP Jill Lindsey MD           Date of Admission: 11/26/2018 Disposition: Home or Self Care     Date of Discharge: 11/29/2018      Admitting Diagnosis: Uterine

## 2018-11-29 NOTE — DIABETES ED
[facility]    Diabetes Education  Note    Tressa Izaguirre Patient Status:  Inpatient   1965 MRN [id number]  Location [facility] 4W/SW/SE Attending Eugenia Franco,   Hosp Day # 3 PCP Anastacio Cao MD    Reason for Visit:

## 2018-11-29 NOTE — DISCHARGE SUMMARY
[place] [facility] - [facility]  Discharge Summary    Anne Mohan Patient Status:  Inpatient    1965 MRN [id number]   Location [facility] 4W/SW/SE Attending Jermaine 111 Day # 3 PCP Oracio Stroud MD           Date of Admissio

## 2018-11-30 ENCOUNTER — TELEPHONE (OUTPATIENT)
Dept: MEDSURG UNIT | Facility: HOSPITAL | Age: 53
End: 2018-11-30

## 2018-11-30 ENCOUNTER — TELEPHONE (OUTPATIENT)
Dept: OBGYN CLINIC | Facility: CLINIC | Age: 53
End: 2018-11-30

## 2018-11-30 NOTE — TELEPHONE ENCOUNTER
Pt had VARUN with TIMMY on 11/26/18. Assisted pt with scheduling staple removal on 12/3/18 at 9am at [facility]. Pt verbalized understanding.

## 2018-12-03 ENCOUNTER — NURSE ONLY (OUTPATIENT)
Dept: OBGYN CLINIC | Facility: CLINIC | Age: 53
End: 2018-12-03
Payer: COMMERCIAL

## 2018-12-03 VITALS — HEART RATE: 76 BPM

## 2018-12-03 DIAGNOSIS — Z48.02: Primary | ICD-10-CM

## 2018-12-03 NOTE — PROGRESS NOTES
Pt is one week post Total Hysterectomy and is  here today for staples removal.Pt is without any complaint,incision is clean and without any sign of infection,staples were removed and steri strips placed on the wound,pt will call the office back with any si

## 2019-01-10 ENCOUNTER — OFFICE VISIT (OUTPATIENT)
Dept: OBGYN CLINIC | Facility: CLINIC | Age: 54
End: 2019-01-10
Payer: COMMERCIAL

## 2019-01-10 VITALS
HEART RATE: 94 BPM | DIASTOLIC BLOOD PRESSURE: 91 MMHG | SYSTOLIC BLOOD PRESSURE: 140 MMHG | WEIGHT: 199.19 LBS | BODY MASS INDEX: 34 KG/M2

## 2019-01-10 DIAGNOSIS — Z98.890 POST-OPERATIVE STATE: Primary | ICD-10-CM

## 2019-01-10 PROCEDURE — 99024 POSTOP FOLLOW-UP VISIT: CPT | Performed by: OBSTETRICS & GYNECOLOGY

## 2019-10-19 ENCOUNTER — TELEPHONE (OUTPATIENT)
Dept: OBGYN CLINIC | Facility: CLINIC | Age: 54
End: 2019-10-19

## 2019-10-19 NOTE — TELEPHONE ENCOUNTER
Pt states she has had 5 UTI's since her hysterectomy in 11/18. Pt was put on preventative medication for UTI's by infectious disease. Pt finished the medication on 9/9/19 and she has had 2 UTI's since.   Pt has seen her PCP who feels this may be hormonal.

## 2019-10-19 NOTE — TELEPHONE ENCOUNTER
Pt states she had 2 UTI's since stopping preventative medication. Requesting to speak with nurse. Please advise.

## 2019-10-24 ENCOUNTER — OFFICE VISIT (OUTPATIENT)
Dept: OBGYN CLINIC | Facility: CLINIC | Age: 54
End: 2019-10-24
Payer: COMMERCIAL

## 2019-10-24 VITALS
SYSTOLIC BLOOD PRESSURE: 155 MMHG | BODY MASS INDEX: 33 KG/M2 | HEART RATE: 84 BPM | DIASTOLIC BLOOD PRESSURE: 88 MMHG | WEIGHT: 194.63 LBS

## 2019-10-24 DIAGNOSIS — Z12.31 SCREENING MAMMOGRAM FOR HIGH-RISK PATIENT: ICD-10-CM

## 2019-10-24 DIAGNOSIS — Z01.419 ENCOUNTER FOR GYNECOLOGICAL EXAMINATION WITHOUT ABNORMAL FINDING: Primary | ICD-10-CM

## 2019-10-24 PROBLEM — Z90.710 STATUS POST HYSTERECTOMY: Status: RESOLVED | Noted: 2018-11-26 | Resolved: 2019-10-24

## 2019-10-24 PROBLEM — N95.0 POST-MENOPAUSAL BLEEDING: Status: RESOLVED | Noted: 2018-10-04 | Resolved: 2019-10-24

## 2019-10-24 PROBLEM — Z90.79 STATUS POST TOTAL ABDOMINAL HYSTERECTOMY AND BILATERAL SALPINGO-OOPHORECTOMY: Status: RESOLVED | Noted: 2018-11-26 | Resolved: 2019-10-24

## 2019-10-24 PROBLEM — Z90.722 STATUS POST TOTAL ABDOMINAL HYSTERECTOMY AND BILATERAL SALPINGO-OOPHORECTOMY: Status: RESOLVED | Noted: 2018-11-26 | Resolved: 2019-10-24

## 2019-10-24 PROBLEM — Z90.710 STATUS POST TOTAL ABDOMINAL HYSTERECTOMY AND BILATERAL SALPINGO-OOPHORECTOMY: Status: RESOLVED | Noted: 2018-11-26 | Resolved: 2019-10-24

## 2019-10-24 PROBLEM — D21.9 FIBROID: Status: RESOLVED | Noted: 2018-10-04 | Resolved: 2019-10-24

## 2019-10-24 PROCEDURE — 99396 PREV VISIT EST AGE 40-64: CPT | Performed by: OBSTETRICS & GYNECOLOGY

## 2019-11-04 NOTE — PROGRESS NOTES
HPI:    Patient ID: Lio Gillette is a 47year old female. HPI   and . She is post hyst for benign disease. Sadly, her Dad  in August after a CVA.       Review of Systems   Constitutional: Negative for appetite change, fatigue and une discharge. There is no rash or lesion on the right labia. There is no rash or lesion on the left labia. Right adnexum displays no mass. Left adnexum displays no mass. No vaginal discharge.       Genitourinary Comments: Bilateral breasts without skin / ni

## 2021-10-25 NOTE — PROGRESS NOTES
POST OP EXAM: No complaints with respect to pain, bowel or bladder. PE: EG, vagina and cuff w/o lesions. No mass / tenderness. IMP: Normal post op exam    PLAN: Guidance on activity. Annual exam in October.
verbal cues/1 person assist

## (undated) DEVICE — Device

## (undated) DEVICE — ENCORE® LATEX ACCLAIM SIZE 8, STERILE LATEX POWDER-FREE SURGICAL GLOVE: Brand: ENCORE

## (undated) DEVICE — SUTURE VICRYL 3-0 SH

## (undated) DEVICE — SUTURE PLAIN GUT 3-0 CT-1

## (undated) DEVICE — PROXIMATE RH ROTATING HEAD SKIN STAPLERS (35 WIDE) CONTAINS 35 STAINLESS STEEL STAPLES: Brand: PROXIMATE

## (undated) DEVICE — ENCORE® LATEX MICRO SIZE 8, STERILE LATEX POWDER-FREE SURGICAL GLOVE: Brand: ENCORE

## (undated) DEVICE — DRAPE SHEET TRANSVERSE LAP

## (undated) DEVICE — SOL  .9 1000ML BTL

## (undated) DEVICE — SUTURE VICRYL 0 J340H

## (undated) DEVICE — SUTURE PDS II 0 CT-1

## (undated) DEVICE — SUTURE VICRYL 3-0 J442H

## (undated) DEVICE — SUTURE VICRYL 0 CT-1

## (undated) DEVICE — LAPAROTOMY: Brand: MEDLINE INDUSTRIES, INC.

## (undated) DEVICE — SPONGE LAP 18X18 XRAY STRL

## (undated) DEVICE — SUTURE VICRYL 0 J906G

## (undated) NOTE — LETTER
MAJOR CASE PREOPERATIVE INSTRUCTIONS    10/31/2018    Dear Megan Parkinson,    Your are having total abdominal hysterectomy with bilateral salpingo oophorectomy on 11/26/18 at 7:30am.    Do not eat or drink anything (including water) after midnight the night befo Document electronically generated by:  Yael De La Torre

## (undated) NOTE — LETTER
INSTRUCTIONS FOR PRE-SURGICAL   ANTIMICROBIAL BATH/SHOWER    Your doctor has recommended a pre-surgical CHG (chlorhexidine gluconate) shower/bath with Betasept (also sold as Hibiclens). It reduces bacteria that can potentially cause infection.   Betasept Keep out of reach of children. If swallowed get medica help or contact [employer]. Store between 60-80 degrees F. [employer] Warning! CHG WILL STAIN YOUR FABRICS! Use with care around shower curtains, towels washcloths rugs and clothes.   Wipe

## (undated) NOTE — LETTER
AUTHORIZATION FOR SURGICAL OPERATION OR OTHER PROCEDURE    1.  I hereby authorize Dr. Jazzy Liao, and [facility] staff assigned to my case to perform the following operation and/or procedure at the [facility]:    _________________________ Patient signature:  ___________________________________________________             Relationship to Patient:           []  Parent    Responsible person                          []  Spouse  In case of minor or                    [] Other  _____________   In